# Patient Record
Sex: FEMALE | Race: WHITE | HISPANIC OR LATINO | Employment: STUDENT | URBAN - METROPOLITAN AREA
[De-identification: names, ages, dates, MRNs, and addresses within clinical notes are randomized per-mention and may not be internally consistent; named-entity substitution may affect disease eponyms.]

---
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
## 2021-04-16 ENCOUNTER — IMMUNIZATION (OUTPATIENT)
Dept: NURSING | Facility: CLINIC | Age: 21
End: 2021-04-16
Payer: COMMERCIAL

## 2021-04-16 PROCEDURE — 0001A PR COVID VAC PFIZER DIL RECON 30 MCG/0.3 ML IM: CPT

## 2021-04-16 PROCEDURE — 91300 PR COVID VAC PFIZER DIL RECON 30 MCG/0.3 ML IM: CPT

## 2021-05-07 ENCOUNTER — IMMUNIZATION (OUTPATIENT)
Dept: NURSING | Facility: CLINIC | Age: 21
End: 2021-05-07
Attending: INTERNAL MEDICINE
Payer: COMMERCIAL

## 2021-05-07 PROCEDURE — 91300 PR COVID VAC PFIZER DIL RECON 30 MCG/0.3 ML IM: CPT

## 2021-05-07 PROCEDURE — 0002A PR COVID VAC PFIZER DIL RECON 30 MCG/0.3 ML IM: CPT

## 2021-09-01 ENCOUNTER — OFFICE VISIT (OUTPATIENT)
Dept: OBGYN | Facility: CLINIC | Age: 21
End: 2021-09-01
Attending: ADVANCED PRACTICE MIDWIFE
Payer: COMMERCIAL

## 2021-09-01 ENCOUNTER — LAB (OUTPATIENT)
Dept: LAB | Facility: CLINIC | Age: 21
End: 2021-09-01
Attending: ADVANCED PRACTICE MIDWIFE
Payer: COMMERCIAL

## 2021-09-01 VITALS
HEIGHT: 63 IN | DIASTOLIC BLOOD PRESSURE: 86 MMHG | WEIGHT: 143 LBS | BODY MASS INDEX: 25.34 KG/M2 | SYSTOLIC BLOOD PRESSURE: 126 MMHG | HEART RATE: 114 BPM

## 2021-09-01 DIAGNOSIS — Z97.5 IUD (INTRAUTERINE DEVICE) IN PLACE: Primary | ICD-10-CM

## 2021-09-01 DIAGNOSIS — Z00.00 ENCOUNTER FOR PREVENTIVE HEALTH EXAMINATION: ICD-10-CM

## 2021-09-01 LAB
ERYTHROCYTE [DISTWIDTH] IN BLOOD BY AUTOMATED COUNT: 11.6 % (ref 10–15)
HCT VFR BLD AUTO: 42.7 % (ref 35–47)
HGB BLD-MCNC: 14.8 G/DL (ref 11.7–15.7)
MCH RBC QN AUTO: 31 PG (ref 26.5–33)
MCHC RBC AUTO-ENTMCNC: 34.7 G/DL (ref 31.5–36.5)
MCV RBC AUTO: 90 FL (ref 78–100)
PLATELET # BLD AUTO: 261 10E3/UL (ref 150–450)
RBC # BLD AUTO: 4.77 10E6/UL (ref 3.8–5.2)
TSH SERPL DL<=0.005 MIU/L-ACNC: 1.4 MU/L (ref 0.4–4)
WBC # BLD AUTO: 12.7 10E3/UL (ref 4–11)

## 2021-09-01 PROCEDURE — 36415 COLL VENOUS BLD VENIPUNCTURE: CPT

## 2021-09-01 PROCEDURE — 85048 AUTOMATED LEUKOCYTE COUNT: CPT

## 2021-09-01 PROCEDURE — G0463 HOSPITAL OUTPT CLINIC VISIT: HCPCS | Performed by: ADVANCED PRACTICE MIDWIFE

## 2021-09-01 PROCEDURE — 84443 ASSAY THYROID STIM HORMONE: CPT

## 2021-09-01 PROCEDURE — 99385 PREV VISIT NEW AGE 18-39: CPT | Performed by: ADVANCED PRACTICE MIDWIFE

## 2021-09-01 RX ORDER — COPPER 313.4 MG/1
1 INTRAUTERINE DEVICE INTRAUTERINE ONCE
COMMUNITY
Start: 2019-11-01

## 2021-09-01 ASSESSMENT — ANXIETY QUESTIONNAIRES
7. FEELING AFRAID AS IF SOMETHING AWFUL MIGHT HAPPEN: NOT AT ALL
4. TROUBLE RELAXING: NOT AT ALL
7. FEELING AFRAID AS IF SOMETHING AWFUL MIGHT HAPPEN: NOT AT ALL
1. FEELING NERVOUS, ANXIOUS, OR ON EDGE: NOT AT ALL
6. BECOMING EASILY ANNOYED OR IRRITABLE: SEVERAL DAYS
8. IF YOU CHECKED OFF ANY PROBLEMS, HOW DIFFICULT HAVE THESE MADE IT FOR YOU TO DO YOUR WORK, TAKE CARE OF THINGS AT HOME, OR GET ALONG WITH OTHER PEOPLE?: NOT DIFFICULT AT ALL
3. WORRYING TOO MUCH ABOUT DIFFERENT THINGS: NOT AT ALL
GAD7 TOTAL SCORE: 1
1. FEELING NERVOUS, ANXIOUS, OR ON EDGE: NOT AT ALL
2. NOT BEING ABLE TO STOP OR CONTROL WORRYING: NOT AT ALL
5. BEING SO RESTLESS THAT IT IS HARD TO SIT STILL: NOT AT ALL

## 2021-09-01 ASSESSMENT — PAIN SCALES - GENERAL: PAINLEVEL: NO PAIN (0)

## 2021-09-01 ASSESSMENT — MIFFLIN-ST. JEOR: SCORE: 1383.8

## 2021-09-01 NOTE — PROGRESS NOTES
Progress Note    SUBJECTIVE:  Radha Ordaz is an 21 year old, , who requests an Annual Preventive Exam.     LMP: 21, 6 days, monthly. On her heaviest day she changes her pads 2-3 a day  Currently sexually active with one male partner x 3 years, no other partners in the last 12 months.     Radha had a Paragard inserted 2019. Her cycles were heavier initially, and now manageable. She is happy with this method but believes she has some hair tied in the Paragard strings. She would like this assessed     Denies abnormal vaginal discharge, itching, irritation, odor, dyspareunia, or dysuria.  Radha is going back to school at [employer], studying to be a , this is her second year of college      Menstrual History:  Menstrual History 2018   LAST MENSTRUAL PERIOD 2018 -   Menarche Age - - 11   Period Cycle (Days) - - 28   Period Duration (Days) - - 6 days   Method of Contraception - - Copper IUD   Period Pattern - - Regular   Menstrual Flow - - Moderate   Dysmenorrhea - - Moderate   PMS Symptoms - - Cramping;Headache   Reviewed Today - - Yes       Last  No results found for: PAP  History of abnormal Pap smear: NO - under age 21, PAP not appropriate for age      HISTORY:  paragard intrauterine copper device, 1 each by Intrauterine route once    No current facility-administered medications on file prior to visit.    Allergies   Allergen Reactions     No Known Allergies      Immunization History   Administered Date(s) Administered     COVID-19,PF,Pfizer 2021, 2021     Comvax (HIB/HepB) 2001, 2001, 2002, 2002     DTAP (<7y) 2001, 2001, 2002, 2002, 2007, 2009     HEPA 2007, 2015     HPV 2015, 10/19/2016     Influenza (IIV3) PF 2007, 2012     Influenza Vaccine IM > 6 months Valent IIV4 10/19/2016     MMR 2002, 2007, 2009     Meningococcal  (Menactra ) 2013, 2020     Pneumococcal (PCV 7) 2001, 2002     Poliovirus, inactivated (IPV) 2001, 2001, 2002, 2007, 2009     TDAP Vaccine (Adacel) 2015     Varicella 2002, 2007, 2009       OB History    Para Term  AB Living   0 0 0 0 0 0   SAB TAB Ectopic Multiple Live Births   0 0 0 0 0     Past Medical History:   Diagnosis Date     NO ACTIVE PROBLEMS      Urinary tract infection      Past Surgical History:   Procedure Laterality Date     NO HISTORY OF SURGERY       wisdom teeth removal  2019     Family History   Problem Relation Age of Onset     Thyroid Disease Mother         Thyroid Cancer. Was removed.     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   Other Topics Concern     None   Social History Narrative     None     Social Determinants of Health     Financial Resource Strain:      Difficulty of Paying Living Expenses:    Food Insecurity:      Worried About Running Out of Food in the Last Year:      Ran Out of Food in the Last Year:    Transportation Needs:      Lack of Transportation (Medical):      Lack of Transportation (Non-Medical):    Physical Activity:      Days of Exercise per Week:      Minutes of Exercise per Session:    Stress:      Feeling of Stress :   "  Social Connections:      Frequency of Communication with Friends and Family:      Frequency of Social Gatherings with Friends and Family:      Attends Advent Services:      Active Member of Clubs or Organizations:      Attends Club or Organization Meetings:      Marital Status:    Intimate Partner Violence:      Fear of Current or Ex-Partner:      Emotionally Abused:      Physically Abused:      Sexually Abused:        ROS  [unfilled]  No flowsheet data found.  LEAH-7 SCORE 9/1/2021   Total Score 1 (minimal anxiety)   Total Score 1         EXAM:  Blood pressure 126/86, pulse 114, height 1.594 m (5' 2.75\"), weight 64.9 kg (143 lb), last menstrual period 08/09/2021. Body mass index is 25.53 kg/m .  General - pleasant female in no acute distress.  Skin - no suspicious lesions or rashes  EENT-  PERRLA, euthyroid with out palpable nodules  Neck - supple without lymphadenopathy.  Lungs - clear to auscultation bilaterally.  Heart - regular rate and rhythm without murmur.  Abdomen - soft, nontender, nondistended, no masses or organomegaly noted.  Musculoskeletal - no gross deformities.  Neurological - normal strength, sensation, and mental status.    Breast Exam:  Breast: Without visible skin changes. No dimpling or lesions seen.   Breasts supple, non-tender with palpation, no dominant mass, nodularity, or nipple discharge noted bilaterally. Axillary nodes negative.      Pelvic Exam:  EG/BUS: Normal genital architecture without lesions, erythema or abnormal secretions Bartholin's, Urethra, [place]'s normal   Urethral meatus: normal   Urethra: no masses, tenderness, or scarring   Bladder: no masses or tenderness   Vagina: moist, pink, rugae with creamy, white and odorless  secretions  Cervix: Nulliparous,, no lesions and IUD strings extend 3 cm from external os. 1 cm knot of hair knotted to IUD strings on the distal end of the strings. Knot cut out with scissors  Uterus:  midposition,  Adnexa: Within normal limits and No " masses, nodularity, tenderness  Rectum:anus normal     Answers for HPI/ROS submitted by the patient on 2021  LEAH 7 TOTAL SCORE: 1      ASSESSMENT:  Encounter Diagnosis   Name Primary?     Encounter for preventive health examination         PLAN:   Orders Placed This Encounter   Procedures     CBC with Platelets     TSH with free T4 reflex     Orders Placed This Encounter   Medications     paragard intrauterine copper device     Si each by Intrauterine route once       Additional teaching done at this visit regarding self breast exam, exercise, birth control, mental health and weight/diet.    Return to clinic in one year.  Follow-up as needed.        FRANCESCO Lion, CNM

## 2021-09-01 NOTE — LETTER
2021       RE: Radha Ordaz  [address] [facility] [place] [address]     Dear Colleague,    Thank you for referring your patient, Radha Ordaz, to the [facility] [place] [facility]. Please see a copy of my visit note below.      Progress Note    SUBJECTIVE:  Radha Ordaz is an 21 year old, , who requests an Annual Preventive Exam.     LMP: 21, 6 days, monthly. On her heaviest day she changes her pads 2-3 a day  Currently sexually active with one male partner x 3 years, no other partners in the last 12 months.     Radha had a Paragard inserted 2019. Her cycles were heavier initially, and now manageable. She is happy with this method but believes she has some hair tied in the Paragard strings. She would like this assessed     Denies abnormal vaginal discharge, itching, irritation, odor, dyspareunia, or dysuria.  Radha is going back to school at [employer], studying to be a , this is her second year of college      Menstrual History:  Menstrual History 2018   LAST MENSTRUAL PERIOD 2018 -   Menarche Age - - 11   Period Cycle (Days) - - 28   Period Duration (Days) - - 6 days   Method of Contraception - - Copper IUD   Period Pattern - - Regular   Menstrual Flow - - Moderate   Dysmenorrhea - - Moderate   PMS Symptoms - - Cramping;Headache   Reviewed Today - - Yes       Last  No results found for: PAP  History of abnormal Pap smear: NO - under age 21, PAP not appropriate for age      HISTORY:  paragard intrauterine copper device, 1 each by Intrauterine route once    No current facility-administered medications on file prior to visit.    Allergies   Allergen Reactions     No Known Allergies      Immunization History   Administered Date(s) Administered     COVID-19,PF,Pfizer 2021, 2021     Comvax (HIB/HepB) 2001, 2001, 2002, 2002      DTAP (<7y) 2001, 2001, 2002, 2002, 2007, 2009     HEPA 2007, 2015     HPV 2015, 10/19/2016     Influenza (IIV3) PF 2007, 2012     Influenza Vaccine IM > 6 months Valent IIV4 10/19/2016     MMR 2002, 2007, 2009     Meningococcal (Menactra ) 2013, 2020     Pneumococcal (PCV 7) 2001, 2002     Poliovirus, inactivated (IPV) 2001, 2001, 2002, 2007, 2009     TDAP Vaccine (Adacel) 2015     Varicella 2002, 2007, 2009       OB History    Para Term  AB Living   0 0 0 0 0 0   SAB TAB Ectopic Multiple Live Births   0 0 0 0 0     Past Medical History:   Diagnosis Date     NO ACTIVE PROBLEMS      Urinary tract infection      Past Surgical History:   Procedure Laterality Date     NO HISTORY OF SURGERY       wisdom teeth removal  2019     Family History   Problem Relation Age of Onset     Thyroid Disease Mother         Thyroid Cancer. Was removed.     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   Other Topics Concern     None   Social History Narrative     None     Social Determinants of Health     Financial Resource Strain:      Difficulty of Paying Living Expenses:    Food Insecurity:      Worried About  "Running Out of Food in the Last Year:      Ran Out of Food in the Last Year:    Transportation Needs:      Lack of Transportation (Medical):      Lack of Transportation (Non-Medical):    Physical Activity:      Days of Exercise per Week:      Minutes of Exercise per Session:    Stress:      Feeling of Stress :    Social Connections:      Frequency of Communication with Friends and Family:      Frequency of Social Gatherings with Friends and Family:      Attends Denominational Services:      Active Member of Clubs or Organizations:      Attends Club or Organization Meetings:      Marital Status:    Intimate Partner Violence:      Fear of Current or Ex-Partner:      Emotionally Abused:      Physically Abused:      Sexually Abused:        ROS  [unfilled]  No flowsheet data found.  LEAH-7 SCORE 9/1/2021   Total Score 1 (minimal anxiety)   Total Score 1         EXAM:  Blood pressure 126/86, pulse 114, height 1.594 m (5' 2.75\"), weight 64.9 kg (143 lb), last menstrual period 08/09/2021. Body mass index is 25.53 kg/m .  General - pleasant female in no acute distress.  Skin - no suspicious lesions or rashes  EENT-  PERRLA, euthyroid with out palpable nodules  Neck - supple without lymphadenopathy.  Lungs - clear to auscultation bilaterally.  Heart - regular rate and rhythm without murmur.  Abdomen - soft, nontender, nondistended, no masses or organomegaly noted.  Musculoskeletal - no gross deformities.  Neurological - normal strength, sensation, and mental status.    Breast Exam:  Breast: Without visible skin changes. No dimpling or lesions seen.   Breasts supple, non-tender with palpation, no dominant mass, nodularity, or nipple discharge noted bilaterally. Axillary nodes negative.      Pelvic Exam:  EG/BUS: Normal genital architecture without lesions, erythema or abnormal secretions Bartholin's, Urethra, [place] normal   Urethral meatus: normal   Urethra: no masses, tenderness, or scarring   Bladder: no masses or tenderness "   Vagina: moist, pink, rugae with creamy, white and odorless  secretions  Cervix: Nulliparous,, no lesions and IUD strings extend 3 cm from external os. 1 cm knot of hair knotted to IUD strings on the distal end of the strings. Knot cut out with scissors  Uterus:  midposition,  Adnexa: Within normal limits and No masses, nodularity, tenderness  Rectum:anus normal     Answers for HPI/ROS submitted by the patient on 2021  LEAH 7 TOTAL SCORE: 1      ASSESSMENT:  Encounter Diagnosis   Name Primary?     Encounter for preventive health examination         PLAN:   Orders Placed This Encounter   Procedures     CBC with Platelets     TSH with free T4 reflex     Orders Placed This Encounter   Medications     paragard intrauterine copper device     Si each by Intrauterine route once       Additional teaching done at this visit regarding self breast exam, exercise, birth control, mental health and weight/diet.    Return to clinic in one year.  Follow-up as needed.    FRANCESCO Lion, KIMM

## 2021-09-02 ASSESSMENT — ANXIETY QUESTIONNAIRES: GAD7 TOTAL SCORE: 1

## 2021-09-18 ENCOUNTER — HEALTH MAINTENANCE LETTER (OUTPATIENT)
Age: 21
End: 2021-09-18

## 2022-11-20 ENCOUNTER — HEALTH MAINTENANCE LETTER (OUTPATIENT)
Age: 22
End: 2022-11-20

## 2023-08-21 ENCOUNTER — OFFICE VISIT (OUTPATIENT)
Dept: MIDWIFE SERVICES | Facility: CLINIC | Age: 23
End: 2023-08-21
Payer: COMMERCIAL

## 2023-08-21 VITALS
WEIGHT: 139 LBS | DIASTOLIC BLOOD PRESSURE: 78 MMHG | HEART RATE: 82 BPM | HEIGHT: 62 IN | OXYGEN SATURATION: 99 % | BODY MASS INDEX: 25.58 KG/M2 | SYSTOLIC BLOOD PRESSURE: 116 MMHG

## 2023-08-21 DIAGNOSIS — Z13.220 LIPID SCREENING: ICD-10-CM

## 2023-08-21 DIAGNOSIS — B96.89 BV (BACTERIAL VAGINOSIS): ICD-10-CM

## 2023-08-21 DIAGNOSIS — Z13.0 SCREENING FOR DEFICIENCY ANEMIA: ICD-10-CM

## 2023-08-21 DIAGNOSIS — Z12.4 ROUTINE CERVICAL SMEAR: ICD-10-CM

## 2023-08-21 DIAGNOSIS — Z01.419 ENCOUNTER FOR GYNECOLOGICAL EXAMINATION WITHOUT ABNORMAL FINDING: Primary | ICD-10-CM

## 2023-08-21 DIAGNOSIS — N76.0 BV (BACTERIAL VAGINOSIS): ICD-10-CM

## 2023-08-21 DIAGNOSIS — Z11.3 SCREEN FOR STD (SEXUALLY TRANSMITTED DISEASE): ICD-10-CM

## 2023-08-21 LAB
CHOLEST SERPL-MCNC: 140 MG/DL
CLUE CELLS: PRESENT
ERYTHROCYTE [DISTWIDTH] IN BLOOD BY AUTOMATED COUNT: 11.6 % (ref 10–15)
HCT VFR BLD AUTO: 44.3 % (ref 35–47)
HDLC SERPL-MCNC: 54 MG/DL
HGB BLD-MCNC: 15 G/DL (ref 11.7–15.7)
LDLC SERPL CALC-MCNC: 67 MG/DL
MCH RBC QN AUTO: 31.1 PG (ref 26.5–33)
MCHC RBC AUTO-ENTMCNC: 33.9 G/DL (ref 31.5–36.5)
MCV RBC AUTO: 92 FL (ref 78–100)
NONHDLC SERPL-MCNC: 86 MG/DL
PLATELET # BLD AUTO: 268 10E3/UL (ref 150–450)
RBC # BLD AUTO: 4.83 10E6/UL (ref 3.8–5.2)
TRICHOMONAS, WET PREP: ABNORMAL
TRIGL SERPL-MCNC: 97 MG/DL
WBC # BLD AUTO: 7.6 10E3/UL (ref 4–11)
WBC'S/HIGH POWER FIELD, WET PREP: ABNORMAL
YEAST, WET PREP: ABNORMAL

## 2023-08-21 PROCEDURE — 87491 CHLMYD TRACH DNA AMP PROBE: CPT | Performed by: ADVANCED PRACTICE MIDWIFE

## 2023-08-21 PROCEDURE — 85027 COMPLETE CBC AUTOMATED: CPT | Performed by: ADVANCED PRACTICE MIDWIFE

## 2023-08-21 PROCEDURE — G0145 SCR C/V CYTO,THINLAYER,RESCR: HCPCS | Performed by: ADVANCED PRACTICE MIDWIFE

## 2023-08-21 PROCEDURE — 87591 N.GONORRHOEAE DNA AMP PROB: CPT | Performed by: ADVANCED PRACTICE MIDWIFE

## 2023-08-21 PROCEDURE — 87210 SMEAR WET MOUNT SALINE/INK: CPT | Performed by: ADVANCED PRACTICE MIDWIFE

## 2023-08-21 PROCEDURE — 36415 COLL VENOUS BLD VENIPUNCTURE: CPT | Performed by: ADVANCED PRACTICE MIDWIFE

## 2023-08-21 PROCEDURE — 80061 LIPID PANEL: CPT | Performed by: ADVANCED PRACTICE MIDWIFE

## 2023-08-21 PROCEDURE — 99385 PREV VISIT NEW AGE 18-39: CPT | Performed by: ADVANCED PRACTICE MIDWIFE

## 2023-08-21 RX ORDER — METRONIDAZOLE 500 MG/1
500 TABLET ORAL 2 TIMES DAILY
Qty: 14 TABLET | Refills: 0 | Status: SHIPPED | OUTPATIENT
Start: 2023-08-21 | End: 2023-08-28

## 2023-08-21 ASSESSMENT — ANXIETY QUESTIONNAIRES
GAD7 TOTAL SCORE: 3
GAD7 TOTAL SCORE: 3
IF YOU CHECKED OFF ANY PROBLEMS ON THIS QUESTIONNAIRE, HOW DIFFICULT HAVE THESE PROBLEMS MADE IT FOR YOU TO DO YOUR WORK, TAKE CARE OF THINGS AT HOME, OR GET ALONG WITH OTHER PEOPLE: NOT DIFFICULT AT ALL
6. BECOMING EASILY ANNOYED OR IRRITABLE: SEVERAL DAYS
7. FEELING AFRAID AS IF SOMETHING AWFUL MIGHT HAPPEN: NOT AT ALL
1. FEELING NERVOUS, ANXIOUS, OR ON EDGE: SEVERAL DAYS
3. WORRYING TOO MUCH ABOUT DIFFERENT THINGS: SEVERAL DAYS
2. NOT BEING ABLE TO STOP OR CONTROL WORRYING: NOT AT ALL
5. BEING SO RESTLESS THAT IT IS HARD TO SIT STILL: NOT AT ALL

## 2023-08-21 ASSESSMENT — PATIENT HEALTH QUESTIONNAIRE - PHQ9
5. POOR APPETITE OR OVEREATING: NOT AT ALL
SUM OF ALL RESPONSES TO PHQ QUESTIONS 1-9: 4

## 2023-08-21 NOTE — PROGRESS NOTES
Radha is a 23 year old  female who presents for annual exam.   Menses are regular q 28-30 days and normal lasting 5 days.  Menses flow: normal. Was heavier when she initially got the paragard but now they are back to normal flow. Patient's last menstrual period was 2023.. Using paragard IUD for contraception.  She is not currently considering pregnancy. 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 Feels like she is getting enough protein and vegetables in her diet. Her mood has been good, has seen a therapist in the past, which helped, no current concerns. She occasionally has an alcoholic drink, does not use recreational drugs or tobacco. Would like STI testing and annual lab work. Pap smear collected today.   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 In a monogamous relationship with boyfriend for 6 months. He goes to [place] as well. Thinking she would like to be a  but will see how her placements with school goes before deciding.   Besides routine health maintenance, she has no other health concerns today .  GYNECOLOGIC HISTORY:  Menarche: 11    Radha is sexually active with 1male partner(s) and is currently in monogamous relationship.    History sexually transmitted infections:No STD history  STI testing offered?  Accepted  ELIE exposure: Unknown  History of abnormal Pap smear: NO - age 21-29 PAP every 3 years recommended  Family history of breast CA: No  Family history of uterine/ovarian CA: No    Family history of colon CA: No    HEALTH MAINTENANCE:  Cholesterol: (No results found for: CHOL History of abnormal lipids: No  Mammo: na . History of abnormal Mammo: Not applicable.  Regular Self Breast Exams: No  Calcium/Vitamin D intake: source:  dairy Adequate? Yes  TSH: (  TSH   Date Value Ref Range Status   2021 1.40  0.40 - 4.00 mU/L Final    )  Pap; (No results found for: PAP )    HISTORY:  OB History    Para Term  AB Living   0 0 0 0 0 0   SAB IAB Ectopic Multiple Live Births   0 0 0 0 0     Past Medical History:   Diagnosis Date    NO ACTIVE PROBLEMS     Urinary tract infection      Past Surgical History:   Procedure Laterality Date    NO HISTORY OF SURGERY      wisdom teeth removal  2019     Family History   Problem Relation Age of Onset    Thyroid Disease Mother         Thyroid Cancer. Was removed.    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       Current Outpatient Medications:     paragard intrauterine copper device, 1 each by Intrauterine route once, Disp: , Rfl:      Allergies   Allergen Reactions    No Known Allergies        Past medical, surgical, social and family history were reviewed and updated in EPIC.    ROS:   C:     NEGATIVE for fever, chills, change in weight  I:       NEGATIVE for worrisome rashes, moles or lesions  E:     NEGATIVE for vision changes or irritation  E/M: NEGATIVE for ear, mouth and throat problems  R:     NEGATIVE for significant cough or SOB  CV:   NEGATIVE for chest pain, palpitations or peripheral edema  GI:     NEGATIVE for nausea, abdominal pain, heartburn, or change in bowel habits  :   NEGATIVE for frequency, dysuria, hematuria, vaginal discharge, or irregular bleeding  M:     NEGATIVE for significant arthralgias or myalgia  N:      NEGATIVE  "for weakness, dizziness or paresthesias  E:      NEGATIVE for temperature intolerance, skin/hair changes  P:      NEGATIVE for changes in mood or affect.    EXAM:  Ht 1.575 m (5' 2\")   Wt 63 kg (139 lb)   LMP 07/22/2023   BMI 25.42 kg/m     BMI: Body mass index is 25.42 kg/m .  Constitutional: healthy, alert and no distress  Head: Normocephalic. No masses, lesions, tenderness or abnormalities  Neck: Neck supple. Trachea midline. No adenopathy. Thyroid symmetric, normal size.   Cardiovascular: RRR.   Respiratory: Negative.   Breast: Breasts reveal mild symmetric fibrocystic densities, but there are no dominant, discrete, fixed or suspicious masses found.  Gastrointestinal: Abdomen soft, non-tender, non-distended. No masses, organomegaly.  :  Vulva:  No external lesions, normal female hair distribution, no inguinal adenopathy.    Urethra:  Midline, non-tender, well supported, no discharge  Vagina:  Moist, pink, brown/ yellow discharge noted, no lesions, IUD strings seen at os, very short  Uterus:  Normal size, anteverted , non-tender, freely mobile  Ovaries:  No masses appreciated, non-tender, mobile  Rectal Exam: deferred  Musculoskeletal: extremities normal  Skin: no suspicious lesions or rashes  Psychiatric: Affect appropriate, cooperative,mentation appears normal.     COUNSELING:   Reviewed preventive health counseling, as reflected in patient instructions  Special attention given to:        Regular exercise       Healthy diet/nutrition       Safe sex practices/STD prevention   reports that she has never smoked. She has never used smokeless tobacco.    Body mass index is 25.42 kg/m ., Normal BMI    FRAX Risk Assessment    ASSESSMENT:  23 year old female with satisfactory annual exam    (Z01.419) Encounter for gynecological examination without abnormal finding  (primary encounter diagnosis)  Plan: Follow up for annual exams     (Z12.4) Routine cervical smear  Plan: Pap screen only - recommended age 21 - 24 " years    (Z11.3) Screen for STD (sexually transmitted disease)  Plan: Chlamydia trachomatis/Neisseria gonorrhoeae by         PCR, Wet preparation    (Z13.220) Lipid screening  Plan: Lipid panel reflex to direct LDL Fasting    (Z13.0) Screening for deficiency anemia  Plan: CBC with platelets    (N76.0,  B96.89) BV (bacterial vaginosis)  Comment: Incidental finding due to STI testing. Opted for treatment due to increased discharge but otherwise asymptomatic, discussed treatment versus no treatment without symptoms.   Plan: metroNIDAZOLE (FLAGYL) 500 MG tablet    Return to clinic in one year for yearly well woman exam. Mychart or call with any questions or concerns.   Deysi ORTIZ  I was present with the FOUZIA student who participated in the service and in the documentation of the services provided. I have verified the history and personally performed the physical exam and medical decision making, as documented by the student and edited by me. FRANCESCO Jaffe CNM

## 2023-08-22 LAB
C TRACH DNA SPEC QL PROBE+SIG AMP: NEGATIVE
N GONORRHOEA DNA SPEC QL NAA+PROBE: NEGATIVE

## 2023-08-23 LAB
BKR LAB AP GYN ADEQUACY: NORMAL
BKR LAB AP GYN INTERPRETATION: NORMAL
BKR LAB AP HPV REFLEX: NO
BKR LAB AP PREVIOUS ABNORMAL: NORMAL
PATH REPORT.COMMENTS IMP SPEC: NORMAL
PATH REPORT.COMMENTS IMP SPEC: NORMAL
PATH REPORT.RELEVANT HX SPEC: NORMAL

## 2023-12-21 ENCOUNTER — OFFICE VISIT (OUTPATIENT)
Dept: FAMILY MEDICINE | Facility: CLINIC | Age: 23
End: 2023-12-21
Payer: COMMERCIAL

## 2023-12-21 VITALS
RESPIRATION RATE: 18 BRPM | BODY MASS INDEX: 22.66 KG/M2 | DIASTOLIC BLOOD PRESSURE: 78 MMHG | OXYGEN SATURATION: 97 % | WEIGHT: 127.9 LBS | HEIGHT: 63 IN | SYSTOLIC BLOOD PRESSURE: 112 MMHG | TEMPERATURE: 98.5 F | HEART RATE: 84 BPM

## 2023-12-21 DIAGNOSIS — Z71.84 ENCOUNTER FOR COUNSELING FOR TRAVEL: Primary | ICD-10-CM

## 2023-12-21 PROCEDURE — 90480 ADMN SARSCOV2 VAC 1/ONLY CMP: CPT | Performed by: PHYSICIAN ASSISTANT

## 2023-12-21 PROCEDURE — 90471 IMMUNIZATION ADMIN: CPT | Performed by: PHYSICIAN ASSISTANT

## 2023-12-21 PROCEDURE — 91320 SARSCV2 VAC 30MCG TRS-SUC IM: CPT | Performed by: PHYSICIAN ASSISTANT

## 2023-12-21 PROCEDURE — 90686 IIV4 VACC NO PRSV 0.5 ML IM: CPT | Performed by: PHYSICIAN ASSISTANT

## 2023-12-21 PROCEDURE — 99401 PREV MED CNSL INDIV APPRX 15: CPT | Mod: 25 | Performed by: PHYSICIAN ASSISTANT

## 2023-12-21 RX ORDER — HYDROXYZINE HYDROCHLORIDE 25 MG/1
25 TABLET, FILM COATED ORAL 3 TIMES DAILY PRN
Qty: 30 TABLET | Refills: 1 | Status: SHIPPED | OUTPATIENT
Start: 2023-12-21

## 2023-12-21 ASSESSMENT — PAIN SCALES - GENERAL: PAINLEVEL: NO PAIN (0)

## 2023-12-21 NOTE — COMMUNITY RESOURCES LIST (ENGLISH)
12/21/2023   [facility]  N/A  For questions about this resource list or additional care needs, please contact your primary care clinic or care manager.  Phone: [phone]   Email: N/A   Address: [address] [place] [address]   Hours: N/A        Hotlines and Helplines       Hotline - Housing crisis  1  [facility] Distance: 2.62 miles      Phone/Virtual   [address] [place] [address]  Language: English  Hours: Mon - Sun Open 24 Hours   Phone: (677) 576-2833 Email: communications@[facility].org Website: https://oscs-[place].org/oursaviourshousing/     2  [facility] Distance: 3.65 miles      Phone/Virtual   [address] [place] [address]  Language: English  Hours: Mon - Sun Open 24 Hours  Fees: Free   Phone: (463) 453-6293 Email: info@saintolaf.Berst Website: http://www.saintolaf.Berst/          Housing       Coordinated Entry access point  3  [facility] [place] ([facility] - Housing Services Distance: 2.65 miles      In-Person   [address] [place] [address]  Language: English  Hours: Mon - Fri 9:00 AM - 5:00 PM  Fees: Free   Phone: (538) 583-1538 Email: housing@[facility].org Website: http://www.[facility].org/housing     4  [facility] - Adult correction [facility] Distance: 3.65 miles      In-Person   [address] [place] [address]  Language: English  Hours: Mon - Sat 10:00 PM - 5:00 PM  Fees: Free   Phone: (207) 369-1969 Email: info@saintolaf.Berst Website: http://www.saintolaf.org/     Drop-in center or day shelter  5  [facility] Distance: 3 miles      In-Person   [address] [place] [address]  Language: English, Sammarinese, Divehi  Hours: Mon - Sat 7:00 AM - 3:00 PM  Fees: Free, Self Pay   Phone: (680) 983-1875 Email: info@[employer].org Website: https://www.cctwincities.org/locations/opportunity-center/     6  [facility]  Distance: 3.03 miles      In-Person   [address] [place] [address]  Language: English  Hours: Mon - Fri 12:00 PM - 3:00 PM  Fees: Free   Phone: (332) 122-5329 Email: jayeshTempMine@[employer].Knewton Website: http://[facility]works[facility].mii/     Housing search assistance  7  [facility] Office of Multicultural Services Distance: 1.34 miles      Phone/Virtual   [address] [place] [address]  Language: American Sign Language, Thai, Yoruba, English, Kyrgyz, Estonian, Oromo, Dutch, Mexican, Telugu, Swahili, Indonesian, Gambian  Hours: Mon - Tue 9:00 AM - 4:00 PM , Wed 10:00 AM - 5:00 PM , Thu - Fri 9:00 AM - 4:00 PM  Fees: Free   Phone: (483) 815-4707 Email: oms@[facility] Website: http://www.[facility]/residents/human-services/multi-cultural-services     8  [facility] ([facility] Distance: 2.55 miles      In-Person   [address] [place] [address]  Language: English, Mexican, Telugu  Hours: Mon - Fri 8:30 AM - 4:30 PM  Fees: Free   Phone: (961) 858-7680 Email: angeline@[facility].org Website: http://www.[facility].org/     Shelter for families  9  [facility] [place] Distance: 19.49 miles      In-Person   [address] [place] [address]  Language: English  Hours: Mon - Fri 3:00 PM - 9:00 AM , Sat - Sun Open 24 Hours  Fees: Free   Phone: (762) 143-9363 Ext.1 Website: https://www.saintandrews.org/2020/07/03/emergency-family-shelter/     Shelter for individuals  10  [facility] Distance: 2.62 miles      In-Person   [address] [place] [address]  Language: English  Hours: Mon - Sun Open 24 Hours  Fees: Free   Phone: (339) 707-3820 Email: communications@[facility].org Website: https://oscs-[place].org/oursaviourshousing/     11  [facility] Distance: 4.13 miles      In-Person   [address] [place] [address]  Language: English  Hours: Mon - Fri 4:00 PM - 9:00 AM  Fees: Free    Phone: (707) 678-3834 Email: margot@[facility].PressConnectChrist[facility] HospitalClearKarma.org Website: https://[facility] CenterPulsity.org/[facility]/     Shelter for youth  12  [facility] and [facility] - Emergency Youth Shelter Distance: 0.39 miles      In-Person   [address] [place] [address]  Language: English  Hours: Mon - Sun Open 24 Hours  Fees: Free   Phone: (762) 372-8155 Email: info@[employer].Showcase-TV Website: https://www.[employer].org/locations/hope-street/          Important Numbers & Websites       Emergency Services   [address]  [place] [address]  Poison Control   (917) 737-3630  Suicide Prevention Lifeline   (521) 135-5288 (TALK)  Child Abuse Hotline   (530) 645-1537 (4-A-Child)  Sexual Assault Hotline   (646) 860-8416 (HOPE)  National Runaway Safeline   (887) 506-1698 (RUNAWAY)  All-Options Talkline   (891) 152-8336  Substance Abuse Referral   (525) 215-9022 (HELP)

## 2023-12-21 NOTE — COMMUNITY RESOURCES LIST (ENGLISH)
12/21/2023   [facility]  N/A  For questions about this resource list or additional care needs, please contact your primary care clinic or care manager.  Phone: [phone]   Email: N/A   Address: [address] [place] [address]   Hours: N/A        Hotlines and Helplines       Hotline - Housing crisis  1  [facility] Distance: 2.62 miles      Phone/Virtual   [address] [place] [address]  Language: English  Hours: Mon - Sun Open 24 Hours   Phone: (795) 382-5662 Email: communications@[facility]-[place].org Website: https://oscs-[place].org/oursaviourshousing/     2  [facility] Distance: 3.65 miles      Phone/Virtual   [address] [place] [address]  Language: English  Hours: Mon - Sun Open 24 Hours  Fees: Free   Phone: (488) 650-2848 Email: info@saintolaf.kSARIA Website: http://www.saintolaf.kSARIA/          Housing       Coordinated Entry access point  3  [facility] [place] ([facility] - Housing Services Distance: 2.65 miles      In-Person   [address] [place] [address]  Language: English  Hours: Mon - Fri 9:00 AM - 5:00 PM  Fees: Free   Phone: (128) 726-2686 Email: housing@[facility].org Website: http://www.[facility].org/housing     4  [facility] - Adult half-way [facility] Distance: 3.65 miles      In-Person   [address] [place] [address]  Language: English  Hours: Mon - Sat 10:00 PM - 5:00 PM  Fees: Free   Phone: (682) 113-6997 Email: info@saintolaf.kSARIA Website: http://www.saintolaf.org/     Drop-in center or day shelter  5  [facility] Distance: 3 miles      In-Person   [address] [place] [address]  Language: English, Angolan, Azeri  Hours: Mon - Sat 7:00 AM - 3:00 PM  Fees: Free, Self Pay   Phone: (881) 578-1058 Email: info@[employer].org Website: https://www.cctwincities.org/locations/opportunity-center/     6  [facility]  Distance: 3.03 miles      In-Person   [address] [place] [address]  Language: English  Hours: Mon - Fri 12:00 PM - 3:00 PM  Fees: Free   Phone: (542) 309-4774 Email: jayeshSite9@[employer] Website: http://Iggliformerly [facility].[employer]/     Housing search assistance  7  [facility] Office of Multicultural Services Distance: 1.34 miles      Phone/Virtual   [address] [place] [address]  Language: American Sign Language, Portuguese, Icelandic, English, Amharic, Swedish, Oromo, Cameroonian, Brazilian, Turkish, Swahili, Estonian, Faroese  Hours: Mon - Tue 9:00 AM - 4:00 PM , Wed 10:00 AM - 5:00 PM , Thu - Fri 9:00 AM - 4:00 PM  Fees: Free   Phone: (394) 479-1953 Email: oms@[facility] Website: http://www.[facility]/residents/human-services/multi-cultural-services     8  [facility] ([facility] Distance: 2.55 miles      In-Person   [address] [place] [address]  Language: English, Brazilian, Turkish  Hours: Mon - Fri 8:30 AM - 4:30 PM  Fees: Free   Phone: (582) 824-1327 Email: angeline@[facility].org Website: http://www.[facility].org/     Shelter for families  9  [facility] Distance: 19.49 miles      In-Person   [address] [place] [address]  Language: English  Hours: Mon - Fri 3:00 PM - 9:00 AM , Sat - Sun Open 24 Hours  Fees: Free   Phone: (437) 741-7723 Ext.1 Website: https://www.saintandrews.org/2020/07/03/emergency-family-shelter/     Shelter for individuals  10  [facility] Distance: 2.62 miles      In-Person   [address] [place] [address]  Language: English  Hours: Mon - Sun Open 24 Hours  Fees: Free   Phone: (510) 365-4706 Email: communications@[facility].org Website: https://oscs-mn.org/oursaviourshousing/     11  [facility] Distance: 4.13 miles      In-Person   [address] [place] [address]  Language: English  Hours: Mon - Fri 4:00 PM - 9:00 AM  Fees: Free    Phone: (437) 201-8880 Email: margot@[facility].Ditto LabsB[facility] Medical CenterYYzhaoche.org Website: https://[facility] SchoolJoySports.org/[facility]/     Shelter for youth  12  [facility] [place] [facility] - Emergency Youth Shelter Distance: 0.39 miles      In-Person   [address] [place] [address]  Language: English  Hours: Mon - Sun Open 24 Hours  Fees: Free   Phone: (159) 319-8513 Email: info@[employer] Expert Website: https://www.[employer].org/locations/hope-street/          Important Numbers & Websites       Emergency Services   [address]  [place] [address]  Poison Control   (186) 897-9263  Suicide Prevention Lifeline   (228) 947-8410 (TALK)  Child Abuse Hotline   (704) 687-1817 (4-A-Child)  Sexual Assault Hotline   (797) 718-2406 (HOPE)  National Runaway Safeline   (609) 720-4794 (RUNAWAY)  All-Options Talkline   (316) 429-7790  Substance Abuse Referral   (104) 654-8065 (HELP)

## 2023-12-21 NOTE — PROGRESS NOTES
Assessment & Plan     (Z71.84) Encounter for counseling for travel  (primary encounter diagnosis)  Comment:   Plan: REVIEW OF HEALTH MAINTENANCE PROTOCOL ORDERS,         INFLUENZA VACCINE IM > 6 MONTHS VALENT IIV4         (AFLURIA/FLUZONE), COVID-19 12+ (2023-24)         (PFIZER), hydrOXYzine HCl (ATARAX) 25 MG tablet          Seasonal booster vaccines updated today, no other vaccines indicated based on the locations that she will be traveling.  Today we discussed jet lag treatments and medication that can help her with sleep during a long flight.  Advised hydroxyzine prn, side effects and proper use discussed.  She was advised to follow up as needed prior to travel.      No LOS data to display   Time spent by me doing chart review, history and exam, documentation and further activities per the note: 25 minutes           Joe Hampton PA-C  [facility]    Juwan Garcia is a 23 year old, presenting for the following health issues:  sleep medication and Consult (And shots )      12/21/2023    10:21 AM   Additional Questions   Roomed by Kareen JUAREZ   Accompanied by self       History of Present Illness       Reason for visit:  Travel    She eats 2-3 servings of fruits and vegetables daily.She consumes 1 sweetened beverage(s) daily.She exercises with enough effort to increase her heart rate 10 to 19 minutes per day.  She exercises with enough effort to increase her heart rate 3 or less days per week.   She is taking medications regularly.     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  She would like to discuss medication for sleep on the 15 hour flight and any possible vaccines that she may need.              Review of Systems         Objective    LMP 12/02/2023   There is no height or weight on file to calculate BMI.  Physical Exam   GENERAL: healthy, alert and no  distress  EYES: Eyes grossly normal to inspection, EOM intact and conjunctivae normal  RESP: breathing comfortably on room air  PSYCH: mentation appears normal, affect normal/bright

## 2024-03-26 ENCOUNTER — TRANSFERRED RECORDS (OUTPATIENT)
Dept: MULTI SPECIALTY CLINIC | Facility: CLINIC | Age: 24
End: 2024-03-26

## 2024-03-26 LAB — CHLAMYDIA - HIM PATIENT REPORTED: NORMAL

## 2024-09-04 ENCOUNTER — E-VISIT (OUTPATIENT)
Dept: URGENT CARE | Facility: CLINIC | Age: 24
End: 2024-09-04
Payer: COMMERCIAL

## 2024-09-04 DIAGNOSIS — J01.90 ACUTE SINUSITIS WITH SYMPTOMS > 10 DAYS: Primary | ICD-10-CM

## 2024-09-04 PROCEDURE — 99421 OL DIG E/M SVC 5-10 MIN: CPT | Performed by: NURSE PRACTITIONER

## 2024-09-14 NOTE — PATIENT INSTRUCTIONS
Acute Sinusitis: Care Instructions  Overview     Acute sinusitis is an inflammation of the mucous membranes inside the nose and sinuses. Sinuses are the hollow spaces in your skull around the eyes and nose. Acute sinusitis often follows a cold. Acute sinusitis causes thick, discolored mucus that drains from the nose or down the back of the throat. It also can cause pain and pressure in your head and face along with a stuffy or blocked nose.  In most cases, sinusitis gets better on its own in 1 to 2 weeks. But some mild symptoms may last for several weeks. Sometimes antibiotics are needed if there is a bacterial infection.  Follow-up care is a key part of your treatment and safety. Be sure to make and go to all appointments, and call your doctor if you are having problems. It's also a good idea to know your test results and keep a list of the medicines you take.  How can you care for yourself at home?  Use saline (saltwater) nasal washes. This can help keep your nasal passages open and wash out mucus and allergens.  You can buy saline nose washes at a grocery store or drugstore. Follow the instructions on the package.  You can make your own at home. Add 1 teaspoon of non-iodized salt and 1 teaspoon of baking soda to 2 cups of distilled or boiled and cooled water. Fill a squeeze bottle or a nasal cleansing pot (such as a neti pot) with the nasal wash. Then put the tip into your nostril, and lean over the sink. With your mouth open, gently squirt the liquid. Repeat on the other side.  Try a decongestant nasal spray like oxymetazoline (Afrin). Do not use it for more than 3 days in a row. Using it for more than 3 days can make your congestion worse.  If needed, take an over-the-counter pain medicine, such as acetaminophen (Tylenol), ibuprofen (Advil, Motrin), or naproxen (Aleve). Read and follow all instructions on the label.  If the doctor prescribed antibiotics, take them as directed. Do not stop taking them just  "because you feel better. You need to take the full course of antibiotics.  Be careful when taking over-the-counter cold or flu medicines and Tylenol at the same time. Many of these medicines have acetaminophen, which is Tylenol. Read the labels to make sure that you are not taking more than the recommended dose. Too much acetaminophen (Tylenol) can be harmful.  Try a steroid nasal spray. It may help with your symptoms.  Breathe warm, moist air. You can use a steamy shower, a hot bath, or a sink filled with hot water. Avoid cold, dry air. Using a humidifier in your home may help. Follow the directions for cleaning the machine.  When should you call for help?   Call your doctor now or seek immediate medical care if:    You have new or worse swelling, redness, or pain in your face or around one or both of your eyes.     You have double vision or a change in your vision.     You have a high fever.     You have a severe headache and a stiff neck.     You have mental changes, such as feeling confused or much less alert.   Watch closely for changes in your health, and be sure to contact your doctor if:    You are not getting better as expected.   Where can you learn more?  Go to https://www.[employer].net/patiented  Enter I933 in the search box to learn more about \"Acute Sinusitis: Care Instructions.\"  Current as of: September 27, 2023               Content Version: 14.0    4611-7427 [employer].   Care instructions adapted under license by your healthcare professional. If you have questions about a medical condition or this instruction, always ask your healthcare professional. [employer] disclaims any warranty or liability for your use of this information.      Dear Radha Ordaz    After reviewing your responses, I've been able to diagnose you with sinusitis.      Based on your responses and diagnosis, I have prescribed Augmentin to treat your symptoms. I have sent this to your pharmacy.? "     It is also important to stay well hydrated, get lots of rest and take over-the-counter decongestants,?tylenol?or ibuprofen if you?are able to?take those medications per your primary care provider to help relieve discomfort.?     It is important that you take?all of?your prescribed medication even if your symptoms are improving after a few doses.? Taking?all of?your medicine helps prevent the symptoms from returning.?     If your symptoms worsen, you develop severe headache, vomiting, high fever (>102), or are not improving in 7 days, please contact your primary care provider for an appointment or visit any of our convenient Walk-in Care or Urgent Care Centers to be seen which can be found on our website?here.?     Thanks again for choosing?us?as your health care partner,?   ?  Yessi Sawyer, BENY?

## 2024-11-25 SDOH — HEALTH STABILITY: PHYSICAL HEALTH: ON AVERAGE, HOW MANY DAYS PER WEEK DO YOU ENGAGE IN MODERATE TO STRENUOUS EXERCISE (LIKE A BRISK WALK)?: 5 DAYS

## 2024-11-25 SDOH — HEALTH STABILITY: PHYSICAL HEALTH: ON AVERAGE, HOW MANY MINUTES DO YOU ENGAGE IN EXERCISE AT THIS LEVEL?: 30 MIN

## 2024-11-25 ASSESSMENT — SOCIAL DETERMINANTS OF HEALTH (SDOH): HOW OFTEN DO YOU GET TOGETHER WITH FRIENDS OR RELATIVES?: THREE TIMES A WEEK

## 2024-11-26 ENCOUNTER — OFFICE VISIT (OUTPATIENT)
Dept: FAMILY MEDICINE | Facility: CLINIC | Age: 24
End: 2024-11-26
Payer: COMMERCIAL

## 2024-11-26 VITALS
DIASTOLIC BLOOD PRESSURE: 82 MMHG | SYSTOLIC BLOOD PRESSURE: 119 MMHG | OXYGEN SATURATION: 98 % | TEMPERATURE: 97.6 F | WEIGHT: 146 LBS | BODY MASS INDEX: 22.91 KG/M2 | HEART RATE: 78 BPM | RESPIRATION RATE: 20 BRPM | HEIGHT: 67 IN

## 2024-11-26 DIAGNOSIS — Z23 NEED FOR VACCINATION: ICD-10-CM

## 2024-11-26 DIAGNOSIS — Z11.3 SCREEN FOR STD (SEXUALLY TRANSMITTED DISEASE): ICD-10-CM

## 2024-11-26 DIAGNOSIS — Z00.00 ROUTINE GENERAL MEDICAL EXAMINATION AT A HEALTH CARE FACILITY: Primary | ICD-10-CM

## 2024-11-26 PROBLEM — Z97.5 PRESENCE OF INTRAUTERINE CONTRACEPTIVE DEVICE (IUD): Status: ACTIVE | Noted: 2024-11-26

## 2024-11-26 LAB
C TRACH DNA SPEC QL PROBE+SIG AMP: NEGATIVE
HCV AB SERPL QL IA: NONREACTIVE
HIV 1+2 AB+HIV1 P24 AG SERPL QL IA: NONREACTIVE
N GONORRHOEA DNA SPEC QL NAA+PROBE: NEGATIVE
T PALLIDUM AB SER QL: NONREACTIVE

## 2024-11-26 PROCEDURE — 90480 ADMN SARSCOV2 VAC 1/ONLY CMP: CPT | Performed by: FAMILY MEDICINE

## 2024-11-26 PROCEDURE — 36415 COLL VENOUS BLD VENIPUNCTURE: CPT | Performed by: FAMILY MEDICINE

## 2024-11-26 PROCEDURE — 90471 IMMUNIZATION ADMIN: CPT | Performed by: FAMILY MEDICINE

## 2024-11-26 PROCEDURE — 86803 HEPATITIS C AB TEST: CPT | Performed by: FAMILY MEDICINE

## 2024-11-26 PROCEDURE — 91320 SARSCV2 VAC 30MCG TRS-SUC IM: CPT | Performed by: FAMILY MEDICINE

## 2024-11-26 PROCEDURE — 87389 HIV-1 AG W/HIV-1&-2 AB AG IA: CPT | Performed by: FAMILY MEDICINE

## 2024-11-26 PROCEDURE — 86780 TREPONEMA PALLIDUM: CPT | Performed by: FAMILY MEDICINE

## 2024-11-26 PROCEDURE — 99395 PREV VISIT EST AGE 18-39: CPT | Mod: 25 | Performed by: FAMILY MEDICINE

## 2024-11-26 PROCEDURE — 87491 CHLMYD TRACH DNA AMP PROBE: CPT | Performed by: FAMILY MEDICINE

## 2024-11-26 PROCEDURE — 90656 IIV3 VACC NO PRSV 0.5 ML IM: CPT | Performed by: FAMILY MEDICINE

## 2024-11-26 PROCEDURE — 87591 N.GONORRHOEAE DNA AMP PROB: CPT | Performed by: FAMILY MEDICINE

## 2024-11-26 NOTE — PATIENT INSTRUCTIONS
Patient Education   Preventive Care Advice   This is general advice given by our system to help you stay healthy. However, your care team may have specific advice just for you. Please talk to your care team about your preventive care needs.  Nutrition  Eat 5 or more servings of fruits and vegetables each day.  Try wheat bread, brown rice and whole grain pasta (instead of white bread, rice, and pasta).  Get enough calcium and vitamin D. Check the label on foods and aim for 100% of the RDA (recommended daily allowance).  Lifestyle  Exercise at least 150 minutes each week  (30 minutes a day, 5 days a week).  Do muscle strengthening activities 2 days a week. These help control your weight and prevent disease.  No smoking.  Wear sunscreen to prevent skin cancer.  Have a dental exam and cleaning every 6 months.  Yearly exams  See your health care team every year to talk about:  Any changes in your health.  Any medicines your care team has prescribed.  Preventive care, family planning, and ways to prevent chronic diseases.  Shots (vaccines)   HPV shots (up to age 26), if you've never had them before.  Hepatitis B shots (up to age 59), if you've never had them before.  COVID-19 shot: Get this shot when it's due.  Flu shot: Get a flu shot every year.  Tetanus shot: Get a tetanus shot every 10 years.  Pneumococcal, hepatitis A, and RSV shots: Ask your care team if you need these based on your risk.  Shingles shot (for age 50 and up)  General health tests  Diabetes screening:  Starting at age 35, Get screened for diabetes at least every 3 years.  If you are younger than age 35, ask your care team if you should be screened for diabetes.  Cholesterol test: At age 39, start having a cholesterol test every 5 years, or more often if advised.  Bone density scan (DEXA): At age 50, ask your care team if you should have this scan for osteoporosis (brittle bones).  Hepatitis C: Get tested at least once in your life.  STIs (sexually  transmitted infections)  Before age 24: Ask your care team if you should be screened for STIs.  After age 24: Get screened for STIs if you're at risk. You are at risk for STIs (including HIV) if:  You are sexually active with more than one person.  You don't use condoms every time.  You or a partner was diagnosed with a sexually transmitted infection.  If you are at risk for HIV, ask about PrEP medicine to prevent HIV.  Get tested for HIV at least once in your life, whether you are at risk for HIV or not.  Cancer screening tests  Cervical cancer screening: If you have a cervix, begin getting regular cervical cancer screening tests starting at age 21.  Breast cancer scan (mammogram): If you've ever had breasts, begin having regular mammograms starting at age 40. This is a scan to check for breast cancer.  Colon cancer screening: It is important to start screening for colon cancer at age 45.  Have a colonoscopy test every 10 years (or more often if you're at risk) Or, ask your provider about stool tests like a FIT test every year or Cologuard test every 3 years.  To learn more about your testing options, visit:   .  For help making a decision, visit:   https://bit.ly/ke19086.  Prostate cancer screening test: If you have a prostate, ask your care team if a prostate cancer screening test (PSA) at age 55 is right for you.  Lung cancer screening: If you are a current or former smoker ages 50 to 80, ask your care team if ongoing lung cancer screenings are right for you.  For informational purposes only. Not to replace the advice of your health care provider. Copyright   2023 [employer]. All rights reserved. Clinically reviewed by the [facility]. [employer] [id number] - REV 01/24.

## 2024-11-26 NOTE — PROGRESS NOTES
Preventive Care Visit  [facility]  Felicita Bridges MD, Family Medicine  Nov 26, 2024      Assessment & Plan     Routine general medical examination at a health care facility  Reviewed/updated Health Maintenance     Screen for STD (sexually transmitted disease)  - HIV Antigen Antibody Combo  - Hepatitis C Screen Reflex to HCV RNA Quant and Genotype  - Chlamydia trachomatis/Neisseria gonorrhoeae by PCR  - Treponema Abs w Reflex to RPR and Titer    Need for vaccination  - INFLUENZA VACCINE,SPLIT VIRUS,TRIVALENT,PF(FLUZONE)  - COVID-19 12+ (PFIZER)      Counseling  Appropriate preventive services were addressed with this patient via screening, questionnaire, or discussion as appropriate for fall prevention, nutrition, physical activity, Tobacco-use cessation, social engagement, weight loss and cognition.  Checklist reviewing preventive services available has been given to the patient.  Reviewed patient's diet, addressing concerns and/or questions.   The patient was instructed to see the dentist every 6 months.       See Patient Instructions      Juwan Garcia is a 24 year old, presenting for the following:  Physical        11/26/2024     7:48 AM   Additional Questions   Roomed by duane   Accompanied by self        Via the Health Maintenance questionnaire, the patient has reported the following services have been completed -Chlamydia: [facility] 2024-03-26, this information has been sent to the abstraction team.    HPI      Health Care Directive  Patient does not have a Health Care Directive: Discussed advance care planning with patient; information given to patient to review.      11/25/2024   General Health   How would you rate your overall physical health? (!) FAIR   Feel stress (tense, anxious, or unable to sleep) Only a little      (!) STRESS CONCERN      11/25/2024   Nutrition   Three or more servings of calcium each day? Yes   Diet: Regular (no restrictions)    How many servings of fruit and vegetables per day? (!) 2-3   How many sweetened beverages each day? (!) 2            11/25/2024   Exercise   Days per week of moderate/strenous exercise 5 days   Average minutes spent exercising at this level 30 min            11/25/2024   Social Factors   Frequency of gathering with friends or relatives Three times a week   Worry food won't last until get money to buy more No   Food not last or not have enough money for food? No   Do you have housing? (Housing is defined as stable permanent housing and does not include staying ouside in a car, in a tent, in an abandoned building, in an overnight shelter, or couch-surfing.) No   Are you worried about losing your housing? No   Lack of transportation? No   Unable to get utilities (heat,electricity)? Yes   Want help with housing or utility concern? No      (!) HOUSING CONCERN PRESENT(!) FINANCIAL RESOURCE STRAIN CONCERN      11/25/2024   Dental   Dentist two times every year? (!) NO            11/25/2024   TB Screening   Were you born outside of the [place]? No          Today's PHQ-2 Score:       11/25/2024     7:57 PM   PHQ-2 ( 1999 [employer])   Q1: Little interest or pleasure in doing things 0    Q2: Feeling down, depressed or hopeless 0    PHQ-2 Score 0    Q1: Little interest or pleasure in doing things Not at all   Q2: Feeling down, depressed or hopeless Not at all   PHQ-2 Score 0       Patient-reported           11/25/2024   Substance Use   Alcohol more than 3/day or more than 7/wk No   Do you use any other substances recreationally? No        Social History     Tobacco Use    Smoking status: Never    Smokeless tobacco: Never    Tobacco comments:     mom and maternal grandmother smoke   Vaping Use    Vaping status: Never Used   Substance Use Topics    Alcohol use: Yes     Comment: 2 a month    Drug use: No             11/25/2024   One time HIV Screening   Previous HIV test? I don't know          11/25/2024   STI Screening   New sexual  "partner(s) since last STI/HIV test? No        History of abnormal Pap smear: No - age 21-29 PAP every 3 years recommended        8/21/2023    10:23 AM   PAP / HPV   PAP Negative for Intraepithelial Lesion or Malignancy (NILM)            11/25/2024   Contraception/Family Planning   Questions about contraception or family planning No           Reviewed and updated as needed this visit by Provider   Tobacco  Allergies  Meds  Problems  Med Hx  Surg Hx  Fam Hx            BP Readings from Last 3 Encounters:   11/26/24 119/82   12/21/23 112/78   08/21/23 116/78    Wt Readings from Last 3 Encounters:   11/26/24 66.2 kg (146 lb)   12/21/23 58 kg (127 lb 14.4 oz)   08/21/23 63 kg (139 lb)            Patient Active Problem List   Diagnosis    Encounter for preventive health examination     Past Surgical History:   Procedure Laterality Date    NO HISTORY OF SURGERY      wisdom teeth removal  2019       Social History     Tobacco Use    Smoking status: Never    Smokeless tobacco: Never    Tobacco comments:     mom and maternal grandmother smoke   Substance Use Topics    Alcohol use: Yes     Comment: 2 a month     Family History   Problem Relation Age of Onset    Thyroid Disease Mother         Thyroid Cancer. Was removed.    Diabetes Maternal Grandmother         Type 2 later in life                Objective    Exam  /82 (BP Location: Right arm, Patient Position: Sitting, Cuff Size: Adult Regular)   Pulse 78   Temp 97.6  F (36.4  C) (Temporal)   Resp 20   Ht 1.69 m (5' 6.54\")   Wt 66.2 kg (146 lb)   SpO2 98%   BMI 23.19 kg/m     Estimated body mass index is 23.19 kg/m  as calculated from the following:    Height as of this encounter: 1.69 m (5' 6.54\").    Weight as of this encounter: 66.2 kg (146 lb).    Physical Exam  GENERAL: healthy, alert and no distress  EYES: Eyes grossly normal to inspection, conjunctivae and sclerae normal  HENT: ear canals and TM's normal  NECK: no adenopathy, no asymmetry, masses, or " scars and thyroid normal to palpation  RESP: lungs clear to auscultation - no rales, rhonchi or wheezes  CV: regular rate and rhythm, normal S1 S2, no S3 or S4, no murmur, click or rub  ABDOMEN: soft, nontender, no hepatosplenomegaly, no masses  MS: no gross musculoskeletal defects noted, no edema  SKIN: no suspicious lesions or rashes  NEURO: Grossly normal strength and tone, mentation intact and speech normal  PSYCH: mentation appears normal, affect normal/bright      Signed Electronically by: Felicita Bridges MD